# Patient Record
Sex: FEMALE | Race: WHITE | NOT HISPANIC OR LATINO | Employment: UNEMPLOYED
[De-identification: names, ages, dates, MRNs, and addresses within clinical notes are randomized per-mention and may not be internally consistent; named-entity substitution may affect disease eponyms.]

---

## 2018-02-21 ENCOUNTER — HOSPITAL (OUTPATIENT)
Dept: OTHER | Age: 58
End: 2018-02-21
Attending: EMERGENCY MEDICINE

## 2018-02-21 LAB
AMORPH SED URNS QL MICRO: ABNORMAL
ANALYZER ANC (IANC): ABNORMAL
ANION GAP SERPL CALC-SCNC: 13 MMOL/L (ref 10–20)
APPEARANCE UR: CLEAR
BACTERIA #/AREA URNS HPF: ABNORMAL /HPF
BASOPHILS # BLD: 0 THOUSAND/MCL (ref 0–0.3)
BASOPHILS NFR BLD: 0 %
BILIRUB UR QL: NEGATIVE
BUN SERPL-MCNC: 14 MG/DL (ref 6–20)
BUN/CREAT SERPL: 20 (ref 7–25)
CALCIUM SERPL-MCNC: 9.1 MG/DL (ref 8.4–10.2)
CAOX CRY URNS QL MICRO: ABNORMAL
CHLORIDE: 110 MMOL/L (ref 98–107)
CO2 SERPL-SCNC: 26 MMOL/L (ref 21–32)
COLOR UR: ABNORMAL
CREAT SERPL-MCNC: 0.7 MG/DL (ref 0.51–0.95)
DIFFERENTIAL METHOD BLD: ABNORMAL
EOSINOPHIL # BLD: 0.2 THOUSAND/MCL (ref 0.1–0.5)
EOSINOPHIL NFR BLD: 3 %
EPITH CASTS #/AREA URNS LPF: ABNORMAL /[LPF]
ERYTHROCYTE [DISTWIDTH] IN BLOOD: 14 % (ref 11–15)
FATTY CASTS #/AREA URNS LPF: ABNORMAL /[LPF]
GLUCOSE SERPL-MCNC: 98 MG/DL (ref 65–99)
GLUCOSE UR-MCNC: NEGATIVE MG/DL
GRAN CASTS #/AREA URNS LPF: ABNORMAL /[LPF]
HEMATOCRIT: 36.2 % (ref 36–46.5)
HGB BLD-MCNC: 11.7 GM/DL (ref 12–15.5)
HGB UR QL: ABNORMAL
HYALINE CASTS #/AREA URNS LPF: ABNORMAL /LPF (ref 0–5)
KETONES UR-MCNC: NEGATIVE MG/DL
LEUKOCYTE ESTERASE UR QL STRIP: NEGATIVE
LITHIUM SERPL-SCNC: 1.1 MMOL/L (ref 0.6–1.2)
LYMPHOCYTES # BLD: 1.5 THOUSAND/MCL (ref 1–4)
LYMPHOCYTES NFR BLD: 18 %
MCH RBC QN AUTO: 29 PG (ref 26–34)
MCHC RBC AUTO-ENTMCNC: 32.3 GM/DL (ref 32–36.5)
MCV RBC AUTO: 89.8 FL (ref 78–100)
MIXED CELL CASTS #/AREA URNS LPF: ABNORMAL /[LPF]
MONOCYTES # BLD: 0.4 THOUSAND/MCL (ref 0.3–0.9)
MONOCYTES NFR BLD: 5 %
MUCOUS THREADS URNS QL MICRO: PRESENT
NEUTROPHILS # BLD: 6.2 THOUSAND/MCL (ref 1.8–7.7)
NEUTROPHILS NFR BLD: 74 %
NEUTS SEG NFR BLD: ABNORMAL %
NITRITE UR QL: NEGATIVE
PERCENT NRBC: ABNORMAL
PH UR: 6 UNIT (ref 5–7)
PLATELET # BLD: 223 THOUSAND/MCL (ref 140–450)
POTASSIUM SERPL-SCNC: 3.7 MMOL/L (ref 3.4–5.1)
PROT UR QL: NEGATIVE MG/DL
RBC # BLD: 4.03 MILLION/MCL (ref 4–5.2)
RBC #/AREA URNS HPF: ABNORMAL /HPF (ref 0–3)
RBC CASTS #/AREA URNS LPF: ABNORMAL /[LPF]
RENAL EPI CELLS #/AREA URNS HPF: ABNORMAL /[HPF]
SODIUM SERPL-SCNC: 145 MMOL/L (ref 135–145)
SP GR UR: 1.01 (ref 1–1.03)
SPECIMEN SOURCE: ABNORMAL
SPERM URNS QL MICRO: ABNORMAL
SQUAMOUS #/AREA URNS HPF: ABNORMAL /HPF (ref 0–5)
T VAGINALIS URNS QL MICRO: ABNORMAL
TRI-PHOS CRY URNS QL MICRO: ABNORMAL
URATE CRY URNS QL MICRO: ABNORMAL
URINE REFLEX: ABNORMAL
URNS CMNT MICRO: ABNORMAL
UROBILINOGEN UR QL: 0.2 MG/DL (ref 0–1)
WAXY CASTS #/AREA URNS LPF: ABNORMAL /[LPF]
WBC # BLD: 8.4 THOUSAND/MCL (ref 4.2–11)
WBC #/AREA URNS HPF: ABNORMAL /HPF (ref 0–5)
WBC CASTS #/AREA URNS LPF: ABNORMAL /[LPF]
YEAST HYPHAE URNS QL MICRO: ABNORMAL
YEAST URNS QL MICRO: ABNORMAL

## 2018-02-27 ENCOUNTER — HOSPITAL (OUTPATIENT)
Dept: OTHER | Age: 58
End: 2018-02-27
Attending: HOSPITALIST

## 2018-02-27 LAB
2009 H1N1 SUBTYPE (RF1N1): NOT DETECTED
ADENOVIRUS (RADENO): NOT DETECTED
ANALYZER ANC (IANC): ABNORMAL
ANION GAP SERPL CALC-SCNC: 11 MMOL/L (ref 10–20)
BASOPHILS # BLD: 0 THOUSAND/MCL (ref 0–0.3)
BASOPHILS NFR BLD: 0 %
BOCAVIRUS (RBOCA): NOT DETECTED
BUN SERPL-MCNC: 16 MG/DL (ref 6–20)
BUN/CREAT SERPL: 23 (ref 7–25)
C. PNEUMONIAE (RCHLP): NOT DETECTED
CALCIUM SERPL-MCNC: 9.7 MG/DL (ref 8.4–10.2)
CHLORIDE: 106 MMOL/L (ref 98–107)
CO2 SERPL-SCNC: 26 MMOL/L (ref 21–32)
CORONAVIRUS 229E (RC229E): NOT DETECTED
CORONAVIRUS HKU1 (RCHKU1): NOT DETECTED
CORONAVIRUS NL63 (RCNL63): NOT DETECTED
CORONAVIRUS OC43 (RCO43): NOT DETECTED
CREAT SERPL-MCNC: 0.71 MG/DL (ref 0.51–0.95)
DIFFERENTIAL METHOD BLD: ABNORMAL
EOSINOPHIL # BLD: 0.2 THOUSAND/MCL (ref 0.1–0.5)
EOSINOPHIL NFR BLD: 4 %
ERYTHROCYTE [DISTWIDTH] IN BLOOD: 14.7 % (ref 11–15)
GLUCOSE SERPL-MCNC: 105 MG/DL (ref 65–99)
HEMATOCRIT: 38.6 % (ref 36–46.5)
HGB BLD-MCNC: 12.3 GM/DL (ref 12–15.5)
INFLUENZA A SUBTYPE H1 (RFLH1): NOT DETECTED
INFLUENZA A SUBTYPE H3 (RFLH3): NOT DETECTED
INFLUENZA A UNSUBTYPABLE (RIAU): NOT DETECTED
INFLUENZA B VIRUS (XFLUB): NOT DETECTED
LITHIUM SERPL-SCNC: 1.3 MMOL/L (ref 0.6–1.2)
LYMPHOCYTES # BLD: 0.7 THOUSAND/MCL (ref 1–4)
LYMPHOCYTES NFR BLD: 12 %
M. PNEUMONIAE (RMYPP): NOT DETECTED
MCH RBC QN AUTO: 29.1 PG (ref 26–34)
MCHC RBC AUTO-ENTMCNC: 31.9 GM/DL (ref 32–36.5)
MCV RBC AUTO: 91.3 FL (ref 78–100)
METAPNEUMOVIRUS (RMETA): POSITIVE
MONOCYTES # BLD: 0.7 THOUSAND/MCL (ref 0.3–0.9)
MONOCYTES NFR BLD: 12 %
NEUTROPHILS # BLD: 4.1 THOUSAND/MCL (ref 1.8–7.7)
NEUTROPHILS NFR BLD: 72 %
NEUTS SEG NFR BLD: ABNORMAL %
PARAINFLUENZA, TYPE 1 (RPAR1): NOT DETECTED
PARAINFLUENZA, TYPE 2 (RPAR2): NOT DETECTED
PARAINFLUENZA, TYPE 3 (RPAR3): NOT DETECTED
PARAINFLUENZA, TYPE 4 (RPAR4): NOT DETECTED
PERCENT NRBC: ABNORMAL
PLATELET # BLD: 211 THOUSAND/MCL (ref 140–450)
POTASSIUM SERPL-SCNC: 4.7 MMOL/L (ref 3.4–5.1)
POTASSIUM SERPL-SCNC: 5.3 MMOL/L (ref 3.4–5.1)
RBC # BLD: 4.23 MILLION/MCL (ref 4–5.2)
RHINOVIRUS/ENTEROVIRUS (RRHINO): NOT DETECTED
RSV, SUBTYPE A (RRSVA): NOT DETECTED
RSV, SUBTYPE B (RRSVB): NOT DETECTED
SODIUM SERPL-SCNC: 138 MMOL/L (ref 135–145)
SPECIMEN SOURCE: ABNORMAL
WBC # BLD: 5.6 THOUSAND/MCL (ref 4.2–11)

## 2018-02-28 LAB
ANALYZER ANC (IANC): ABNORMAL
ANION GAP SERPL CALC-SCNC: 12 MMOL/L (ref 10–20)
BASOPHILS # BLD: 0 THOUSAND/MCL (ref 0–0.3)
BASOPHILS NFR BLD: 0 %
BUN SERPL-MCNC: 15 MG/DL (ref 6–20)
BUN/CREAT SERPL: 21 (ref 7–25)
CALCIUM SERPL-MCNC: 9.1 MG/DL (ref 8.4–10.2)
CHLORIDE: 109 MMOL/L (ref 98–107)
CO2 SERPL-SCNC: 24 MMOL/L (ref 21–32)
CREAT SERPL-MCNC: 0.71 MG/DL (ref 0.51–0.95)
DIFFERENTIAL METHOD BLD: ABNORMAL
EOSINOPHIL # BLD: 0.1 THOUSAND/MCL (ref 0.1–0.5)
EOSINOPHIL NFR BLD: 2 %
ERYTHROCYTE [DISTWIDTH] IN BLOOD: 14.6 % (ref 11–15)
GLUCOSE SERPL-MCNC: 100 MG/DL (ref 65–99)
HEMATOCRIT: 34.3 % (ref 36–46.5)
HGB BLD-MCNC: 10.8 GM/DL (ref 12–15.5)
LYMPHOCYTES # BLD: 1.9 THOUSAND/MCL (ref 1–4)
LYMPHOCYTES NFR BLD: 32 %
MCH RBC QN AUTO: 28.9 PG (ref 26–34)
MCHC RBC AUTO-ENTMCNC: 31.5 GM/DL (ref 32–36.5)
MCV RBC AUTO: 91.7 FL (ref 78–100)
MONOCYTES # BLD: 0.8 THOUSAND/MCL (ref 0.3–0.9)
MONOCYTES NFR BLD: 13 %
NEUTROPHILS # BLD: 3.1 THOUSAND/MCL (ref 1.8–7.7)
NEUTROPHILS NFR BLD: 53 %
NEUTS SEG NFR BLD: ABNORMAL %
PERCENT NRBC: ABNORMAL
PLATELET # BLD: 202 THOUSAND/MCL (ref 140–450)
POTASSIUM SERPL-SCNC: 4 MMOL/L (ref 3.4–5.1)
RBC # BLD: 3.74 MILLION/MCL (ref 4–5.2)
SODIUM SERPL-SCNC: 141 MMOL/L (ref 135–145)
WBC # BLD: 5.9 THOUSAND/MCL (ref 4.2–11)

## 2018-03-18 ENCOUNTER — HOSPITAL ENCOUNTER (EMERGENCY)
Facility: HOSPITAL | Age: 58
Discharge: HOME | End: 2018-03-18
Attending: EMERGENCY MEDICINE
Payer: MEDICAID

## 2018-03-18 ENCOUNTER — APPOINTMENT (OUTPATIENT)
Dept: GENERAL RADIOLOGY | Facility: HOSPITAL | Age: 58
End: 2018-03-18
Attending: EMERGENCY MEDICINE
Payer: MEDICAID

## 2018-03-18 VITALS
WEIGHT: 150 LBS | DIASTOLIC BLOOD PRESSURE: 73 MMHG | SYSTOLIC BLOOD PRESSURE: 125 MMHG | HEIGHT: 68 IN | BODY MASS INDEX: 22.73 KG/M2 | OXYGEN SATURATION: 97 % | RESPIRATION RATE: 20 BRPM | HEART RATE: 71 BPM | TEMPERATURE: 98 F

## 2018-03-18 DIAGNOSIS — J18.9 COMMUNITY ACQUIRED PNEUMONIA OF RIGHT UPPER LOBE OF LUNG: Primary | ICD-10-CM

## 2018-03-18 PROCEDURE — 99283 EMERGENCY DEPT VISIT LOW MDM: CPT

## 2018-03-18 PROCEDURE — 71046 X-RAY EXAM CHEST 2 VIEWS: CPT | Performed by: EMERGENCY MEDICINE

## 2018-03-18 RX ORDER — DOXYCYCLINE HYCLATE 100 MG/1
100 CAPSULE ORAL 2 TIMES DAILY
Qty: 20 CAPSULE | Refills: 0 | Status: SHIPPED | OUTPATIENT
Start: 2018-03-18 | End: 2018-03-28

## 2018-03-18 RX ORDER — DOXYCYCLINE 100 MG/1
100 CAPSULE ORAL ONCE
Status: COMPLETED | OUTPATIENT
Start: 2018-03-18 | End: 2018-03-18

## 2018-03-18 NOTE — ED INITIAL ASSESSMENT (HPI)
Via medics from [employer].   Cough/congested x 4 days    Patient with pneumonia x 4 in the past few months

## 2018-03-18 NOTE — ED PROVIDER NOTES
Patient Seen in: [facility] AND [facility] Emergency Department    History   Patient presents with:  Cough/URI    Stated Complaint: congested    HPI    Patient presents to the emergency department with complaint of cough congestion for the past 4 days.   She tell 0638]  O2 Device: None (Room air) [03/18/18 0638]    Current:/73   Pulse 71   Temp 97.6 °F (36.4 °C) (Oral)   Resp 20   Ht 172.7 cm (5' 8\")   Wt 68 kg   LMP 01/14/2018   SpO2 97%   BMI 22.81 kg/m²         Physical Exam  Constitutional:  Alert, well address to make sure that she is going to get the medication and get to where she needs to go. They will see her for a second time.     ED Course   Labs Reviewed - No data to display     MDM     97% Normal  Pulse oximetry         Disposition and Plan     W

## 2018-03-18 NOTE — ED NOTES
Pt arrives to ed22 via EMS for c/o cough, congestion, body aches/chills for the past few days. Pt w/ hx of pneumonia and states her symptoms are similar but worse today. Pt aox4/4, full rom, speaking in complete sentences.

## 2018-03-18 NOTE — CM/SW NOTE
Asked to speak to Pt to assist her in finding follow up care. Pt is homeless and stays at [facility]. Pt gets a disability check every month and has a  through [employer]. Pt has [employer].  Pt states she has a PMD but hasn't seen him in 2

## 2018-03-26 ENCOUNTER — HOSPITAL (OUTPATIENT)
Dept: OTHER | Age: 58
End: 2018-03-26
Attending: EMERGENCY MEDICINE

## 2018-03-27 ENCOUNTER — HOSPITAL (OUTPATIENT)
Dept: OTHER | Age: 58
End: 2018-03-27
Attending: INTERNAL MEDICINE

## 2018-03-27 LAB
ALBUMIN SERPL-MCNC: 4.4 GM/DL (ref 3.6–5.1)
ALP SERPL-CCNC: 78 UNIT/L (ref 45–117)
ALT SERPL-CCNC: 21 UNIT/L
ANALYZER ANC (IANC): ABNORMAL
ANION GAP SERPL CALC-SCNC: 16 MMOL/L (ref 10–20)
AST SERPL-CCNC: 33 UNIT/L
BASOPHILS # BLD: 0 THOUSAND/MCL (ref 0–0.3)
BASOPHILS NFR BLD: 0 %
BILIRUB CONJ SERPL-MCNC: 0.1 MG/DL (ref 0–0.2)
BILIRUB SERPL-MCNC: 0.5 MG/DL (ref 0.2–1)
BUN SERPL-MCNC: 17 MG/DL (ref 6–20)
BUN/CREAT SERPL: 18 (ref 7–25)
CALCIUM SERPL-MCNC: 9.9 MG/DL (ref 8.4–10.2)
CHLORIDE: 101 MMOL/L (ref 98–107)
CO2 SERPL-SCNC: 23 MMOL/L (ref 21–32)
CREAT SERPL-MCNC: 0.92 MG/DL (ref 0.51–0.95)
DIFFERENTIAL METHOD BLD: ABNORMAL
EOSINOPHIL # BLD: 0.1 THOUSAND/MCL (ref 0.1–0.5)
EOSINOPHIL NFR BLD: 1 %
ERYTHROCYTE [DISTWIDTH] IN BLOOD: 14.1 % (ref 11–15)
ETHANOL SERPL-MCNC: NORMAL MG/DL
GLUCOSE SERPL-MCNC: 162 MG/DL (ref 65–99)
HEMATOCRIT: 36.7 % (ref 36–46.5)
HGB BLD-MCNC: 11.9 GM/DL (ref 12–15.5)
LITHIUM SERPL-SCNC: 2.3 MMOL/L (ref 0.6–1.2)
LYMPHOCYTES # BLD: 1.5 THOUSAND/MCL (ref 1–4)
LYMPHOCYTES NFR BLD: 14 %
MAGNESIUM SERPL-MCNC: 2 MG/DL (ref 1.7–2.4)
MCH RBC QN AUTO: 28.8 PG (ref 26–34)
MCHC RBC AUTO-ENTMCNC: 32.4 GM/DL (ref 32–36.5)
MCV RBC AUTO: 88.9 FL (ref 78–100)
MONOCYTES # BLD: 0.5 THOUSAND/MCL (ref 0.3–0.9)
MONOCYTES NFR BLD: 5 %
NEUTROPHILS # BLD: 8.2 THOUSAND/MCL (ref 1.8–7.7)
NEUTROPHILS NFR BLD: 80 %
NEUTS SEG NFR BLD: ABNORMAL %
PERCENT NRBC: ABNORMAL
PHOSPHATE SERPL-MCNC: 4.1 MG/DL (ref 2.4–4.7)
PLATELET # BLD: 260 THOUSAND/MCL (ref 140–450)
POTASSIUM SERPL-SCNC: 3.5 MMOL/L (ref 3.4–5.1)
PROT SERPL-MCNC: 8.1 GM/DL (ref 6.4–8.2)
RBC # BLD: 4.13 MILLION/MCL (ref 4–5.2)
SODIUM SERPL-SCNC: 136 MMOL/L (ref 135–145)
WBC # BLD: 10.3 THOUSAND/MCL (ref 4.2–11)

## 2018-03-28 ENCOUNTER — DIAGNOSTIC TRANS (OUTPATIENT)
Dept: OTHER | Age: 58
End: 2018-03-28

## 2018-03-28 LAB
ALBUMIN SERPL-MCNC: 3.4 GM/DL (ref 3.6–5.1)
ALBUMIN/GLOB SERPL: 1.1 {RATIO} (ref 1–2.4)
ALP SERPL-CCNC: 66 UNIT/L (ref 45–117)
ALT SERPL-CCNC: 15 UNIT/L
ANALYZER ANC (IANC): ABNORMAL
ANION GAP SERPL CALC-SCNC: 12 MMOL/L (ref 10–20)
AST SERPL-CCNC: 26 UNIT/L
BASOPHILS # BLD: 0 THOUSAND/MCL (ref 0–0.3)
BASOPHILS NFR BLD: 0 %
BILIRUB SERPL-MCNC: 0.4 MG/DL (ref 0.2–1)
BUN SERPL-MCNC: 13 MG/DL (ref 6–20)
BUN/CREAT SERPL: 13 (ref 7–25)
CALCIUM SERPL-MCNC: 9.4 MG/DL (ref 8.4–10.2)
CHLORIDE: 109 MMOL/L (ref 98–107)
CO2 SERPL-SCNC: 23 MMOL/L (ref 21–32)
CREAT SERPL-MCNC: 1.04 MG/DL (ref 0.51–0.95)
DIFFERENTIAL METHOD BLD: ABNORMAL
EOSINOPHIL # BLD: 0.2 THOUSAND/MCL (ref 0.1–0.5)
EOSINOPHIL NFR BLD: 2 %
ERYTHROCYTE [DISTWIDTH] IN BLOOD: 14.2 % (ref 11–15)
GLOBULIN SER-MCNC: 3.1 GM/DL (ref 2–4)
GLUCOSE BLDC GLUCOMTR-MCNC: 100 MG/DL (ref 65–99)
GLUCOSE SERPL-MCNC: 94 MG/DL (ref 65–99)
HEMATOCRIT: 33.5 % (ref 36–46.5)
HGB BLD-MCNC: 10.6 GM/DL (ref 12–15.5)
LITHIUM SERPL-SCNC: 1.4 MMOL/L (ref 0.6–1.2)
LYMPHOCYTES # BLD: 2.4 THOUSAND/MCL (ref 1–4)
LYMPHOCYTES NFR BLD: 24 %
MCH RBC QN AUTO: 28.4 PG (ref 26–34)
MCHC RBC AUTO-ENTMCNC: 31.6 GM/DL (ref 32–36.5)
MCV RBC AUTO: 89.8 FL (ref 78–100)
MONOCYTES # BLD: 0.8 THOUSAND/MCL (ref 0.3–0.9)
MONOCYTES NFR BLD: 8 %
NEUTROPHILS # BLD: 6.4 THOUSAND/MCL (ref 1.8–7.7)
NEUTROPHILS NFR BLD: 66 %
NEUTS SEG NFR BLD: ABNORMAL %
PERCENT NRBC: ABNORMAL
PLATELET # BLD: 212 THOUSAND/MCL (ref 140–450)
POTASSIUM SERPL-SCNC: 4.1 MMOL/L (ref 3.4–5.1)
PROT SERPL-MCNC: 6.5 GM/DL (ref 6.4–8.2)
RBC # BLD: 3.73 MILLION/MCL (ref 4–5.2)
SODIUM SERPL-SCNC: 140 MMOL/L (ref 135–145)
WBC # BLD: 9.8 THOUSAND/MCL (ref 4.2–11)

## 2018-06-23 ENCOUNTER — APPOINTMENT (OUTPATIENT)
Dept: GENERAL RADIOLOGY | Facility: HOSPITAL | Age: 58
End: 2018-06-23
Attending: EMERGENCY MEDICINE
Payer: MEDICAID

## 2018-06-23 ENCOUNTER — HOSPITAL ENCOUNTER (EMERGENCY)
Facility: HOSPITAL | Age: 58
Discharge: HOME | End: 2018-06-23
Attending: EMERGENCY MEDICINE
Payer: MEDICAID

## 2018-06-23 VITALS
TEMPERATURE: 98 F | HEIGHT: 68 IN | HEART RATE: 71 BPM | DIASTOLIC BLOOD PRESSURE: 65 MMHG | BODY MASS INDEX: 22.73 KG/M2 | SYSTOLIC BLOOD PRESSURE: 134 MMHG | OXYGEN SATURATION: 99 % | WEIGHT: 150 LBS | RESPIRATION RATE: 18 BRPM

## 2018-06-23 DIAGNOSIS — J32.9 CHRONIC SINUSITIS, UNSPECIFIED LOCATION: Primary | ICD-10-CM

## 2018-06-23 DIAGNOSIS — H66.93 BILATERAL OTITIS MEDIA, UNSPECIFIED OTITIS MEDIA TYPE: ICD-10-CM

## 2018-06-23 PROCEDURE — 99283 EMERGENCY DEPT VISIT LOW MDM: CPT

## 2018-06-23 PROCEDURE — 71046 X-RAY EXAM CHEST 2 VIEWS: CPT | Performed by: EMERGENCY MEDICINE

## 2018-06-23 RX ORDER — AMOXICILLIN AND CLAVULANATE POTASSIUM 875; 125 MG/1; MG/1
1 TABLET, FILM COATED ORAL 2 TIMES DAILY
Qty: 20 TABLET | Refills: 0 | Status: SHIPPED | OUTPATIENT
Start: 2018-06-23 | End: 2018-07-03

## 2018-06-23 RX ORDER — ALBUTEROL SULFATE 90 UG/1
2 AEROSOL, METERED RESPIRATORY (INHALATION) EVERY 4 HOURS PRN
Qty: 1 INHALER | Refills: 0 | Status: SHIPPED | OUTPATIENT
Start: 2018-06-23 | End: 2018-07-23

## 2018-06-23 NOTE — ED PROVIDER NOTES
Patient Seen in: [facility] Emergency Department    History   Patient presents with:  Cough/URI    Stated Complaint: congestion    HPI    The patient is a 44-year-old female with a history of multiple episodes of pneumonia in the past, presenting to she does complain of some mild tenderness along the bilateral cervical lymph nodes. Mucus membranes moist.  No stridor, trismus or drooling. Oropharynx is normal.  Uvula is midline. No tonsillar erythema, hypertrophy or exudates.   External ears are dru pneumothorax. BONES:  Normal for age.     =====  CONCLUSION:  No acute intrathoracic process identified. Patient has been observed and she has continued to be well-appearing. She is not hypoxic. Chest x-ray shows no pneumonia.   However I do believe

## 2018-06-23 NOTE — ED NOTES
Pt awake and alert,skin w/d,resps reg/unlabored. Pt appears comfortable. Pt aware awaiting MD dispo.

## 2018-07-14 ENCOUNTER — HOSPITAL ENCOUNTER (EMERGENCY)
Facility: HOSPITAL | Age: 58
Discharge: HOME | End: 2018-07-14
Attending: EMERGENCY MEDICINE
Payer: MEDICAID

## 2018-07-14 VITALS
DIASTOLIC BLOOD PRESSURE: 62 MMHG | HEIGHT: 68 IN | RESPIRATION RATE: 18 BRPM | WEIGHT: 150 LBS | HEART RATE: 62 BPM | BODY MASS INDEX: 22.73 KG/M2 | SYSTOLIC BLOOD PRESSURE: 102 MMHG | TEMPERATURE: 98 F | OXYGEN SATURATION: 98 %

## 2018-07-14 DIAGNOSIS — H65.191 ACUTE EFFUSION OF RIGHT EAR: Primary | ICD-10-CM

## 2018-07-14 PROCEDURE — 99283 EMERGENCY DEPT VISIT LOW MDM: CPT

## 2018-07-14 RX ORDER — PREDNISONE 20 MG/1
40 TABLET ORAL DAILY
Qty: 6 TABLET | Refills: 0 | Status: SHIPPED | OUTPATIENT
Start: 2018-07-14 | End: 2018-07-17

## 2018-07-14 RX ORDER — LORATADINE 10 MG/1
10 TABLET ORAL DAILY
Qty: 30 TABLET | Refills: 0 | Status: SHIPPED | OUTPATIENT
Start: 2018-07-14 | End: 2018-08-13

## 2018-07-14 NOTE — ED INITIAL ASSESSMENT (HPI)
Pt states she was seen here in emergency room 3 weeks ago for symptoms of  Her \"ears being plugged. \" Pt states she was given amoxicillin when she was seen here and finished the prescription without relief.

## 2018-07-14 NOTE — ED PROVIDER NOTES
Patient Seen in: [facility] Emergency Department    History   Patient presents with:  Ear Problem Pain (neurosensory)    Stated Complaint:     HPI    Here for feeling her right ear is blocked. It has been going on for 3 weeks.   Took amoxicillin did display    ED Course as of Jul 14 1540  ------------------------------------------------------------      MDM     Claritin, prednisone, sinus rinses, follow-up with PCP.           Disposition and Plan     Clinical Impression:  Acute effusion of right ear  (

## 2018-07-14 NOTE — ED NOTES
Discharge papers reviewed with pt, copy given. Pt given prescriptions for prednisone and loratadine. No questions at time of discharge.

## 2018-09-15 NOTE — ED PROVIDER NOTES
Patient Seen in: [facility] AND [facility] Emergency Department    History   Patient presents with:  Alcohol Problem    Stated Complaint: etoh    HPI    60-year-old female presents via EMS for alcohol intoxication.   Patient was found passed out in someone's lawn normal range of motion, full passive range of motion without pain and phonation normal. Neck supple. No tracheal tenderness, no spinous process tenderness and no muscular tenderness present. No neck rigidity. No erythema and normal range of motion present. Patient will be allowed to sober up in the ED or should family arrive can be discharged with them. She is endorsed to Dr. Ada Emerson. Imaging:   No results found.            Clinical impression as well as any lab results and radiology findings were discuss

## 2018-09-15 NOTE — ED INITIAL ASSESSMENT (HPI)
Pt found lying on ground outside a house which was not hers. Admits to etoh, slurred speech, cooperative at time of triage.

## 2018-09-15 NOTE — ED PROVIDER NOTES
Patient endorsed pending sobriety or sober ride. Reevaluated at 0300. Patient alert, oriented, clear speech stable gait at this time. Alert and oriented ×3. Clinically sober. No complaints, would like to go home.   Patient requested take a cab home, I f

## 2018-10-05 ENCOUNTER — APPOINTMENT (OUTPATIENT)
Dept: CT IMAGING | Facility: HOSPITAL | Age: 58
End: 2018-10-05
Attending: EMERGENCY MEDICINE
Payer: MEDICAID

## 2018-10-05 PROCEDURE — 70450 CT HEAD/BRAIN W/O DYE: CPT | Performed by: EMERGENCY MEDICINE

## 2018-10-05 NOTE — ED NOTES
Pt here, answering questions well but sleepy. Report given from Anatoliy. VIKI BURGOS does not want pt leaving. Security called. Pt clothes taken off, placed in gown prior to this RN arrival. Pt calm. Belongings searched.

## 2018-10-05 NOTE — ED NOTES
Nelly is reporting she is feeling anxious and requesting medications to help her relax. She denies any help from the PO ativan. Dr. Tali Jimenez will order IM meds.

## 2018-10-05 NOTE — ED INITIAL ASSESSMENT (HPI)
Pt sts she has been off of her diazepam because \"they took her off of it\" in the mean time she has been taking her lithium. Pt c/o of bilateral leg weakness, describes as \"pulled muscles\" pt called EMS to her home for assistance.   Denies wanting to hu

## 2018-10-05 NOTE — ED PROVIDER NOTES
Patient Seen in: [facility] AND [facility] Emergency Department     History   Patient presents with:  Lynn (psychiatric)    Stated Complaint:     HPI    63year old female with a history of bipolar joe presents to the ER complaining that the floor hit her in t without left periorbital erythema. Nose: Nose normal.   Mouth/Throat: Mucous membranes are normal. Mucous membranes are not pale and not cyanotic. Eyes: Conjunctivae and lids are normal. Right conjunctiva is not injected.  Left conjunctiva is not inject ---------                               -----------         ------                     CBC W/ DIFFERENTIAL[[id number]]          Abnormal            Final result                 Please view results for these tests on the individual orders.    UR disease. **Nausea/vomiting. ^*Warm or crowded place, prolonged orthostasis, fear/pain/other emotion.     EGSYS Interpretation:  -1    = or > 3: Cardiac syncope likely (95% sensitive), Mortality at 21–24 months= 17%  <3: Cardiac syncope less likely, Mortal emergency department population. [address] [place] [address] 2008 Aug;52(2):151-9. Epub 2008 Feb 20. PubMed PMID: [id number].     Limitations of history:   able to obtain history from patient  Factors limiting our ability to obtain a history: Gillian with tangential and dis further follow-up evaluation and treatment. Risk:  Patient is at High risk of significant complication or comorbidity.         Disposition and Plan     Clinical Impression:  Gillian ([facility] [address]  (primary encounter diagnosis)  Syncope and collapse    Disposition:

## 2018-10-05 NOTE — ED PROVIDER NOTES
Pt endorsed to me by Dr. Lluvia Pierce for continuation of care - CT without acute process, reevaluation without sinus tenderness. Labs unremarkable. PO ativan given for pt pacing in room and singing. Pt is medically cleared for psych transfer.  Pt to be tra

## 2018-10-05 NOTE — BH LEVEL OF CARE ASSESSMENT
Level of Care Assessment Note    General Questions  Why are you here?: \"Myself. My knees gave out and I fell on the floor. I needed help getting out of the shower and to the shower. \"  Pt reports this has happened to her once before when she was 47 year me out there. God\"  Is your experience of thoughts of dying by suicide: (EMIL)  Protective Factors: Pt reports that she is now on the correct medication. She also reports she lives with her mother.   Past Suicidal Ideation: Attempt  Describe: Pt reports s Paranoid  Describe Delusion: \"People\"  Pt did not specify  Depression Symptoms: Increased irritability  Depression Description: Pt would not specify symptoms. \"ARe you fucking kidding me? \"  Anxiety Symptoms: Generalized  Panic Attacks: None reported \"2 Twisted Tea every other day\"  How long with this pattern of use?: \"Since I was a kid\"  Last Use?: 5 days ago  Is your current use the most/worst it has ever been? : No    Illicit and Prescription Drug Use  Which if any illicit/prescription drugs hav Affect: Superficial  Stability of Affect: Labile  Attitude toward staff: Co-operative  Speech  Rate of Speech: Appropriate  Flow of Speech: Pressured  Intensity of Volume: Loud  Clarity: Slurred  Cognition  Concentration: Impaired  Memory: Recent memory in swearing. Pt denies SIB. Pt reports she thinks \"people\" are trying to harm her, but would not give details. Pt also reported she drinks 2 \"Twisted teas\" every other night but denies other drug use.   Due to pt's mood lability, lack of insight, disorg

## 2018-10-06 NOTE — ED NOTES
Report given to Yasir Mcgarry at [facility] reports he will re contact when a decision to accept this patient is made by the psychiatrist.

## 2018-10-06 NOTE — ED NOTES
Antonio Marlow from [facility] called back and reports Nelly will be accepted by Dr. Charlie Mitchell. Antonio Listen asks that we send pt no earlier than 2230 r/t high acuity on their unit. Nelly remains calm and resting on car.

## 2018-10-06 NOTE — ED NOTES
Received a call from a women who identified herself as the pt's daughter, but did not state her name. This person wanted to know where pt had been transferred to.  Writer explained HIPPA and medical information and was ultimately informed that I could not g

## 2018-11-09 NOTE — ED NOTES
No beds at [facility].  [facility]- no appropraite beds at this time (can call back later tonight if still looking). Called Patricia and faxed packet.

## 2018-11-09 NOTE — ED INITIAL ASSESSMENT (HPI)
Pt arrives via ems for ams. Per medics, pt was found walking down street with no clothes on. Pt was refsing to answer questions from PD and medics. Pt alert now.

## 2018-11-09 NOTE — ED PROVIDER NOTES
Patient Seen in: [facility] AND [facility] Emergency Department    History   Patient presents with:  Altered Mental Status (neurologic)    Stated Complaint: ams    HPI    Patient is a 67-year-old female found wandering naked in the snow.   Paramedics and police w and dry. No rash noted. Psychiatric: Her affect is labile. Her speech is tangential. She is agitated, aggressive and hyperactive. She expresses no suicidal plans and no homicidal plans. Nursing note and vitals reviewed.       I am leaving I just forgot

## 2018-11-09 NOTE — ED NOTES
No beds at [facility]. [facility] only goes up to age 54 for pts. [facility] does not have an appropriate bed at this time but may have discharges later if still looking. Called Renan He- left a message and faxed packet.

## 2018-11-09 NOTE — BH LEVEL OF CARE ASSESSMENT
Level of Care Assessment Note    General Questions  Why are you here?: \"They found me hanging myself by the rafter. The holy kid, the rafter kid. She can't stand anyone. She gets better and better. This way, that way, this way, that way.   Her fingerna Legal    Suicide Risk  Source of information for CSSR: Collateral  In what setting is the screener performed?: telephone  1. Have you wished you were dead or wished you could go to sleep and not wake up? (past 30 days): (EMIL- \"No, to fuck myself.   Every d History or Personal Lived Experience of Loss or Near Loss by Suicide: Denies    Danger to Others/Property  Have you harmed someone or had thoughts about wanting someone harmed or killed in the past 30 days?: No(\"Everybody in the fucking world is fucked up and not sleeping much)  Use of Sleep Aids: Unknown  Appetite Symptoms: Decreased  Unplanned Weight Loss: No(Unknown)  Unplanned Weight Gain: No  History of Eating Disorder: No  Active Eating Disorder: No School: No  Employment Status: Disabled  Job Issues: (N/A)  Concerns/Conflicts with Social Relationships: No  Decreased Functional Ability: Complete ADLs; Cook;Clean house(Per daughter)  Do you have any prior/current legal concerns?: None  History of Gang I Consciousness: Alert  Behavior  Exhibited behavior: Unable to participate    Assessment Summary  Assessment Summary: Pt is a 63 yr old female who was brought to ER after PD found her walking down the street naked in 31 degree weather.   Pt present as manic Disorder Manic: with Psychotic Features  Secondary Psychiatric Diagnoses  Substance Related and Addictive Disorders: Alcohol-Related Disorder - Alcohol use Disorder  Secondary Alcohol Use Disorder: Mild        Pertinent Non-psychiatric Diagnoses: None repo

## 2018-11-09 NOTE — ED NOTES
Pt unable to provide clean catch urine after 2 attempts d/t manic episode. Per MD, pt straight cathed for urine with assist by ED RN, ED tech and 2 security personnel. Pt cooperative for cath. 500ml clear yellow urine output.   Cath removed without incid

## 2018-11-10 NOTE — ED NOTES
Report given to [facility] Emergency Department. Abebe Karla will pass patients information to the psychiatrist and will await call back with decision on acceptance.

## 2021-01-19 NOTE — BH LEVEL OF CARE ASSESSMENT
Crisis Evaluation Assessment    Nelly Madrid YOB: 1960   Age 62year old MRN [id number]   Location [facility] Attending Karan Armas MD      Patient's legal sex: female  Patient identifies as: female  Marjan Rose denies current suicidal thoughts. She recently left a (mental health SNF), residential program ([facility]), where she had resided for close to a year.           Non-Suicidal Self-Injury:   denied        Access to Means:  Access to Means  Has access to means but stopped going, \"because it was the same stories over and over again. I was bored\"          Functional Achievement:   Left [facility] 12/28/21. Nelly reports she was supposed to be discharged in January.   However she elft to attend her daughter's wake/ uncomfortably full?: No  Do you worry that you have lost Control over how much you eat?: No  Have you recently lost more than One stone (14 lb) in a 3-month period?: No  Do you believe yourself to be Fat when others say you are too thin?: No  Would you say last one was . Nelly has been at [facility] for the past 12 months. She l;eft the end of December after her 25year old daughterdied. Nelly reports she was supposed to leave in January but, decide not to return after her daughter .   Nelly reports s

## 2021-01-19 NOTE — ED INITIAL ASSESSMENT (HPI)
Patient here c/o \"needing to calm down\" after verbal altercation with daughter at home. Currently denies SI/HI. Recent psych hospitalization. Denies a/v hallucinations. Patient pleasant and cooperative with staff.

## 2021-01-19 NOTE — ED PROVIDER NOTES
Patient Seen in: [facility] AND [facility] Emergency Department      History   Patient presents with:   Anxiety/Panic attack    Stated Complaint:     HPI/Subjective:   HPI    The patient is a 56-year-old female with a history of anxiety and bipolar disorder who p She is not ill-appearing. HENT:      Head: Normocephalic and atraumatic. Nose: Nose normal.      Mouth/Throat:      Mouth: Mucous membranes are moist.   Eyes:      Extraocular Movements: Extraocular movements intact.       Conjunctiva/sclera: Conjunc inpatient criteria for treatment. Outpatient referrals given and message will be sent to Dr. Savanah Dover for follow-up. I will refill 2 weeks of her medications.   Patient home in a cab         MDM      Pulse Ox: 99%, Normal, room air    Cardiac Monitor: Pu

## 2021-01-20 NOTE — CM/SW NOTE
[facility] called again for medicar after it was cancelled earlier because the pt stated it would take too long. She is back requesting another ride. [facility] called.  ETA: 60-90 minutes    PCS completed/

## 2021-01-23 ENCOUNTER — HOSPITAL ENCOUNTER (EMERGENCY)
Age: 61
Discharge: HOME | End: 2021-01-24
Attending: EMERGENCY MEDICINE

## 2021-01-23 DIAGNOSIS — F31.12 BIPOLAR AFFECTIVE DISORDER, CURRENTLY MANIC, MODERATE (CMD): Primary | ICD-10-CM

## 2021-01-23 LAB
AMPHETAMINES UR QL SCN>500 NG/ML: NEGATIVE
ANION GAP SERPL CALC-SCNC: 11 MMOL/L (ref 10–20)
BARBITURATES UR QL SCN>200 NG/ML: NEGATIVE
BASOPHILS # BLD: 0 K/MCL (ref 0–0.3)
BASOPHILS NFR BLD: 0 %
BENZODIAZ UR QL SCN>200 NG/ML: NEGATIVE
BUN SERPL-MCNC: 26 MG/DL (ref 6–20)
BUN/CREAT SERPL: 29 (ref 7–25)
BZE UR QL SCN>150 NG/ML: NEGATIVE
CALCIUM SERPL-MCNC: 9.5 MG/DL (ref 8.4–10.2)
CANNABINOIDS UR QL SCN>50 NG/ML: NEGATIVE
CHLORIDE SERPL-SCNC: 109 MMOL/L (ref 98–107)
CO2 SERPL-SCNC: 25 MMOL/L (ref 21–32)
CREAT SERPL-MCNC: 0.9 MG/DL (ref 0.51–0.95)
DEPRECATED RDW RBC: 42.3 FL (ref 39–50)
EOSINOPHIL # BLD: 0 K/MCL (ref 0–0.5)
EOSINOPHIL NFR BLD: 0 %
ERYTHROCYTE [DISTWIDTH] IN BLOOD: 12.5 % (ref 11–15)
ETHANOL SERPL-MCNC: NORMAL MG/DL
FASTING DURATION TIME PATIENT: ABNORMAL H
GFR SERPLBLD BASED ON 1.73 SQ M-ARVRAT: 70 ML/MIN/1.73M2
GLUCOSE SERPL-MCNC: 92 MG/DL (ref 65–99)
HCT VFR BLD CALC: 42.8 % (ref 36–46.5)
HGB BLD-MCNC: 13.9 G/DL (ref 12–15.5)
IMM GRANULOCYTES # BLD AUTO: 0 K/MCL (ref 0–0.2)
IMM GRANULOCYTES # BLD: 1 %
LYMPHOCYTES # BLD: 2.5 K/MCL (ref 1–4)
LYMPHOCYTES NFR BLD: 35 %
MCH RBC QN AUTO: 30.1 PG (ref 26–34)
MCHC RBC AUTO-ENTMCNC: 32.5 G/DL (ref 32–36.5)
MCV RBC AUTO: 92.6 FL (ref 78–100)
MONOCYTES # BLD: 0.9 K/MCL (ref 0.3–0.9)
MONOCYTES NFR BLD: 12 %
NEUTROPHILS # BLD: 3.7 K/MCL (ref 1.8–7.7)
NEUTROPHILS NFR BLD: 52 %
NRBC BLD MANUAL-RTO: 0 /100 WBC
OPIATES UR QL SCN>300 NG/ML: NEGATIVE
PCP UR QL SCN>25 NG/ML: NEGATIVE
PLATELET # BLD AUTO: 182 K/MCL (ref 140–450)
POTASSIUM SERPL-SCNC: 4 MMOL/L (ref 3.4–5.1)
RBC # BLD: 4.62 MIL/MCL (ref 4–5.2)
SARS-COV-2 RNA RESP QL NAA+PROBE: NOT DETECTED
SERVICE CMNT-IMP: NORMAL
SERVICE CMNT-IMP: NORMAL
SODIUM SERPL-SCNC: 141 MMOL/L (ref 135–145)
WBC # BLD: 7.1 K/MCL (ref 4.2–11)

## 2021-01-23 PROCEDURE — 99283 EMERGENCY DEPT VISIT LOW MDM: CPT

## 2021-01-23 PROCEDURE — 85025 COMPLETE CBC W/AUTO DIFF WBC: CPT | Performed by: INTERNAL MEDICINE

## 2021-01-23 PROCEDURE — U0003 INFECTIOUS AGENT DETECTION BY NUCLEIC ACID (DNA OR RNA); SEVERE ACUTE RESPIRATORY SYNDROME CORONAVIRUS 2 (SARS-COV-2) (CORONAVIRUS DISEASE [COVID-19]), AMPLIFIED PROBE TECHNIQUE, MAKING USE OF HIGH THROUGHPUT TECHNOLOGIES AS DESCRIBED BY CMS-2020-01-R: HCPCS | Performed by: EMERGENCY MEDICINE

## 2021-01-23 PROCEDURE — 80048 BASIC METABOLIC PNL TOTAL CA: CPT | Performed by: INTERNAL MEDICINE

## 2021-01-23 PROCEDURE — 36415 COLL VENOUS BLD VENIPUNCTURE: CPT

## 2021-01-23 PROCEDURE — 80307 DRUG TEST PRSMV CHEM ANLYZR: CPT | Performed by: INTERNAL MEDICINE

## 2021-01-23 PROCEDURE — 82077 ASSAY SPEC XCP UR&BREATH IA: CPT | Performed by: INTERNAL MEDICINE

## 2021-01-23 PROCEDURE — 10002803 HB RX 637: Performed by: INTERNAL MEDICINE

## 2021-01-23 PROCEDURE — C9803 HOPD COVID-19 SPEC COLLECT: HCPCS

## 2021-01-23 PROCEDURE — 10004651 HB RX, NO CHARGE ITEM

## 2021-01-23 PROCEDURE — 90839 PSYTX CRISIS INITIAL 60 MIN: CPT

## 2021-01-23 PROCEDURE — 87635 SARS-COV-2 COVID-19 AMP PRB: CPT | Performed by: EMERGENCY MEDICINE

## 2021-01-23 PROCEDURE — 99284 EMERGENCY DEPT VISIT MOD MDM: CPT | Performed by: INTERNAL MEDICINE

## 2021-01-23 RX ORDER — ACETAMINOPHEN 500 MG
TABLET ORAL
Status: COMPLETED
Start: 2021-01-23 | End: 2021-01-23

## 2021-01-23 RX ORDER — ACETAMINOPHEN 325 MG/1
975 TABLET ORAL ONCE
Status: COMPLETED | OUTPATIENT
Start: 2021-01-23 | End: 2021-01-23

## 2021-01-23 RX ORDER — RISPERIDONE 2 MG/1
2 TABLET ORAL ONCE
Status: COMPLETED | OUTPATIENT
Start: 2021-01-23 | End: 2021-01-23

## 2021-01-23 RX ADMIN — RISPERIDONE 2 MG: 2 TABLET ORAL at 18:58

## 2021-01-23 RX ADMIN — ACETAMINOPHEN 1000 MG: 325 TABLET ORAL at 20:46

## 2021-01-23 RX ADMIN — ACETAMINOPHEN 1000 MG: 500 TABLET ORAL at 20:46

## 2021-01-23 ASSESSMENT — ENCOUNTER SYMPTOMS
CHILLS: 0
FEVER: 0
VOMITING: 0
NERVOUS/ANXIOUS: 1
SHORTNESS OF BREATH: 0
FATIGUE: 0
COLOR CHANGE: 0
WOUND: 0
DIZZINESS: 0
DIAPHORESIS: 0
LIGHT-HEADEDNESS: 0
COUGH: 0
NAUSEA: 0
SORE THROAT: 0
EYE DISCHARGE: 0
ABDOMINAL PAIN: 0

## 2021-01-24 VITALS
WEIGHT: 188 LBS | HEIGHT: 68 IN | OXYGEN SATURATION: 95 % | TEMPERATURE: 96.6 F | HEART RATE: 74 BPM | DIASTOLIC BLOOD PRESSURE: 82 MMHG | BODY MASS INDEX: 28.49 KG/M2 | RESPIRATION RATE: 18 BRPM | SYSTOLIC BLOOD PRESSURE: 131 MMHG

## 2021-01-24 LAB
RAINBOW EXTRA TUBES HOLD SPECIMEN: NORMAL
RAINBOW EXTRA TUBES HOLD SPECIMEN: NORMAL

## 2021-06-18 ENCOUNTER — APPOINTMENT (OUTPATIENT)
Dept: GENERAL RADIOLOGY | Facility: HOSPITAL | Age: 61
End: 2021-06-18
Attending: EMERGENCY MEDICINE
Payer: MEDICAID

## 2021-06-18 ENCOUNTER — HOSPITAL ENCOUNTER (EMERGENCY)
Facility: HOSPITAL | Age: 61
Discharge: HOME | End: 2021-06-18
Attending: EMERGENCY MEDICINE
Payer: MEDICAID

## 2021-06-18 VITALS
HEIGHT: 68 IN | BODY MASS INDEX: 27.28 KG/M2 | HEART RATE: 82 BPM | SYSTOLIC BLOOD PRESSURE: 112 MMHG | OXYGEN SATURATION: 96 % | WEIGHT: 180 LBS | DIASTOLIC BLOOD PRESSURE: 70 MMHG | RESPIRATION RATE: 18 BRPM | TEMPERATURE: 98 F

## 2021-06-18 DIAGNOSIS — S20.229A CONTUSION OF BACK, UNSPECIFIED LATERALITY, INITIAL ENCOUNTER: Primary | ICD-10-CM

## 2021-06-18 PROCEDURE — 99284 EMERGENCY DEPT VISIT MOD MDM: CPT

## 2021-06-18 PROCEDURE — 72100 X-RAY EXAM L-S SPINE 2/3 VWS: CPT | Performed by: EMERGENCY MEDICINE

## 2021-06-18 PROCEDURE — 85025 COMPLETE CBC W/AUTO DIFF WBC: CPT | Performed by: EMERGENCY MEDICINE

## 2021-06-18 PROCEDURE — 93005 ELECTROCARDIOGRAM TRACING: CPT

## 2021-06-18 PROCEDURE — 93010 ELECTROCARDIOGRAM REPORT: CPT | Performed by: EMERGENCY MEDICINE

## 2021-06-18 PROCEDURE — 80048 BASIC METABOLIC PNL TOTAL CA: CPT | Performed by: EMERGENCY MEDICINE

## 2021-06-18 PROCEDURE — 36415 COLL VENOUS BLD VENIPUNCTURE: CPT

## 2021-06-18 RX ORDER — IBUPROFEN 600 MG/1
600 TABLET ORAL ONCE
Status: DISCONTINUED | OUTPATIENT
Start: 2021-06-18 | End: 2021-06-18

## 2021-06-18 NOTE — ED INITIAL ASSESSMENT (HPI)
Fell 1 week ago. States she has pain all over. Walked into fd to c/o weakness. Clear speech. A&o x4.

## 2021-06-19 NOTE — CM/SW NOTE
Spoke with patient in the ER waiting area regarding her request for transportation home.     [facility] offered to get patient a medicar home as her insurance is [employer], but patient refused to wait 90 mins - 2 hours and said she would arrange her own transporta

## 2021-06-19 NOTE — ED PROVIDER NOTES
Patient Seen in: [facility] AND [facility] Emergency Department      History   Patient presents with:  Fatigue    Stated Complaint: generalized weakness    HPI/Subjective:   HPI    18-year-old female presents for evaluation for lower back pain and some swelling Current:/54   Pulse 83   Temp 98.2 °F (36.8 °C) (Oral)   Resp 16   Ht 172.7 cm (5' 8\")   Wt 81.6 kg   SpO2 96%   BMI 27.37 kg/m²         Physical Exam  Vitals and nursing note reviewed.    Constitutional:       Appearance: She is well-developed Speech: Speech normal.         Behavior: Behavior normal.               ED Course     Labs Reviewed   BASIC METABOLIC PANEL (8) - Abnormal; Notable for the following components:       Result Value    Glucose 125 (*)     Chloride 114 (*)     Calcium, T acute fracture or acute malalignment VERTEBRAL BODIES:   Mild degenerative anterior spondylolisthesis L4 relative to L5. Marked disc space narrowing endplate discogenic changes vacuum disc effect L5-S1. Bilateral facet arthrosis L3-4, L4-5 and L5-S1.  [address]

## 2021-08-20 NOTE — ED INITIAL ASSESSMENT (HPI)
Per medics pt was wandering around outside, sitting in the grass laughing and talking to herself so a bystander called 911.

## 2021-08-20 NOTE — BH LEVEL OF CARE ASSESSMENT
Crisis Evaluation Assessment    [facility] YOB: 1960   Age 62year old MRN [id number]   Location [facility] Attending Karan Bridges MD      Patient's legal sex: female  Patient identifies as: female  Manuel Dixonco wandering into the road, Pee Sim had no safety concerns. Risk to Self or Others  Patient is unable to care for herself currently due to psychosis.             Suicide Risk Assessments:    Source of information for CSSR: Patient  In what setting is patterns and statements. Patient unable to answer basic questions. Patient oriented x2, currently thinking it is October. Unable to fully assess patient due to her inability to answer questions appropriately.             Substance Use:  \"I drink Vodka, Harmed by a Partner/Caregiver?: No  Health Concerns r/t Abuse: No    Mental Status Exam:   General Appearance  Characteristics: Disheveled  Eye Contact: Direct  Psychomotor Behavior  Gait/Movement: Brisk  Abnormal movements: None  Posture:  Other (comment); but unable to clarify quantity or frequency.             Risk/Protective Factors  Protective Factors: pt unable to report clearly    Level of Care Recommendations  Consulted with: Dr. Gissel Clement  Level of Care Recommendation: Inpatient Acute Care  Unit: [facility]

## 2021-08-20 NOTE — ED PROVIDER NOTES
Patient Seen in: [facility] AND [facility] Emergency Department      History   Patient presents with:  Eval-P    Stated Complaint: psych    HPI/Subjective:   HPI    Patient is a 72-year-old female who arrives by EMS for psychiatric assessment.   When asked why pa for the following components:       Result Value    BUN 30 (*)     BUN/CREA Ratio 33.0 (*)     Calculated Osmolality 302 (*)     All other components within normal limits   ETHYL ALCOHOL - Abnormal; Notable for the following components:    Ethyl Alcohol 3

## 2021-08-20 NOTE — ED NOTES
Attempted to reach daughter for collateral and find out if pt has a guardian or POA. No answer, left vague message requesting call back.

## 2021-08-21 NOTE — ED NOTES
New Certificate completed by Dr Jr Morales, (per request of Claire Ivory). Faxed Certificate to [facility] and spoke to Manas Oliver to set up transport. Coco Trinidad RN. New Certificate scanned into [facility].

## 2021-08-21 NOTE — ED QUICK NOTES
Received report from [facility]. Patient awake and alert, laying on bed. No distress. 1:1 observation continues.

## 2021-08-21 NOTE — ED NOTES
Writer met with patient to provide patient copies of patient rights and petition. Patient handed them back to writer and said \"I don't have rights. \" Patient agreed to have patient keep documents with chart for EMS.  Patient asked writer Brennen Shipley you get me a

## 2021-08-21 NOTE — ED NOTES
Per Tanya Montoya at [facility], patient has exhausted all resources and packet will not be considered in AM.

## 2021-08-21 NOTE — ED NOTES
Writer called [facility] and reached Mary who transferred writer to [facility] Rozina placed referral. Yasmin Douglas reviewing. [facility] deflected earlier in the shift after exhausting resources. Amanda will be able to receive referral after 14:00.

## 2021-08-21 NOTE — ED NOTES
Change of Status Form emailed to [facility] and scanned into [address]

## 2021-08-21 NOTE — ED QUICK NOTES
Patient daughter ask if the office would call her to let her know when the lab orders are put in for patient PSA and if there is anything else she needs to do prior to his appt. This ERT provided breakfast tray to pt. Tray currently at bedside.

## 2021-08-21 NOTE — ED NOTES
Francisca Grande with [facility] reported needing top line on a certificate signed. Fela Champion agreed to accept based on current paperwork so long as psychiatrist signs top line on second certificate.  Once they receive the second certificate, they will provide accepti

## 2021-08-21 NOTE — ED QUICK NOTES
RN report provided to Adelia at [address] [place] [address].   Vance Hamilton will call back if patient is accepted for transfer

## 2021-08-21 NOTE — ED NOTES
Sammy Franks to review packet tomorrow following discharges. Packet to be sent after COVID test result.

## 2021-08-21 NOTE — ED QUICK NOTES
Patient transferred from ER bed to an inpatient bed for comfort. Patient agreeable throughout the transfer.

## 2021-08-21 NOTE — ED PROVIDER NOTES
Patient endorsed to me by Dr. Jose Mercado for continuation of care. Patient is awaiting inpatient psychiatric transfer for schizophrenia and has been calm throughout my shift. Patient endorsed to Dr. Julissa Anthony for continuation of care.

## 2021-08-21 NOTE — ED QUICK NOTES
Patel Kc from [facility] called and patient is accepted at [facility] in [place]. Accepting MD is Dr. Magda Vega. CERTIFICATE NEEDS TO BE RENEWED BY PSYCHIATRIST AND FAXED BEFORE TRANSFER.         Room number is 466B for [facility]

## 2021-09-26 ENCOUNTER — HOSPITAL ENCOUNTER (EMERGENCY)
Facility: HOSPITAL | Age: 61
Discharge: HOME | End: 2021-09-26
Attending: EMERGENCY MEDICINE
Payer: MEDICAID

## 2021-09-26 VITALS
HEIGHT: 67 IN | BODY MASS INDEX: 27.47 KG/M2 | DIASTOLIC BLOOD PRESSURE: 49 MMHG | OXYGEN SATURATION: 95 % | SYSTOLIC BLOOD PRESSURE: 102 MMHG | TEMPERATURE: 98 F | HEART RATE: 59 BPM | RESPIRATION RATE: 18 BRPM | WEIGHT: 175 LBS

## 2021-09-26 DIAGNOSIS — N39.0 URINARY TRACT INFECTION WITHOUT HEMATURIA, SITE UNSPECIFIED: Primary | ICD-10-CM

## 2021-09-26 LAB
BILIRUB UR QL: NEGATIVE
COLOR UR: YELLOW
GLUCOSE UR-MCNC: NEGATIVE MG/DL
NITRITE UR QL STRIP.AUTO: NEGATIVE
PH UR: 6 [PH] (ref 5–8)
PROT UR-MCNC: 100 MG/DL
RBC #/AREA URNS AUTO: >10 /HPF
SP GR UR STRIP: 1.01 (ref 1–1.03)
UROBILINOGEN UR STRIP-ACNC: <2
WBC #/AREA URNS AUTO: >50 /HPF
WBC CLUMPS UR QL AUTO: PRESENT /HPF

## 2021-09-26 PROCEDURE — 99283 EMERGENCY DEPT VISIT LOW MDM: CPT

## 2021-09-26 PROCEDURE — 81001 URINALYSIS AUTO W/SCOPE: CPT | Performed by: EMERGENCY MEDICINE

## 2021-09-26 PROCEDURE — 87186 SC STD MICRODIL/AGAR DIL: CPT | Performed by: EMERGENCY MEDICINE

## 2021-09-26 PROCEDURE — 87086 URINE CULTURE/COLONY COUNT: CPT | Performed by: EMERGENCY MEDICINE

## 2021-09-26 PROCEDURE — 87077 CULTURE AEROBIC IDENTIFY: CPT | Performed by: EMERGENCY MEDICINE

## 2021-09-26 RX ORDER — CEFADROXIL 500 MG/1
500 CAPSULE ORAL 2 TIMES DAILY
Qty: 20 CAPSULE | Refills: 0 | Status: SHIPPED | OUTPATIENT
Start: 2021-09-26 | End: 2021-10-06

## 2021-09-26 NOTE — ED INITIAL ASSESSMENT (HPI)
Nelly arrives via [facility] from home for complaints of \"UTI symptoms\" Reports it hurts to urinates and states she was not prescribed those \"sulfa pill\" when she was discharged on 9/1.     Appears in no distress

## 2021-09-26 NOTE — ED PROVIDER NOTES
Patient Seen in: [facility] AND [facility] Emergency Department      History   Patient presents with:  Urinary Symptoms    Stated Complaint: uti symptoms    Subjective:   HPI    Patient is a 31-year-old female who arrives by EMS from home with 11 days of burning Urine Cloudy (*)     Ketones Urine Trace (*)     Blood Urine Large (*)     Protein Urine 100  (*)     Leukocyte Esterase Urine Large (*)     WBC Urine >50 (*)     RBC Urine >10 (*)     Bacteria Urine 1+ (*)     Squamous Epi.  Cells Few (*)     Transitional

## 2021-09-26 NOTE — ED QUICK NOTES
medicar arranged , eta 90 min  Lunch tray ordered  Apple juice provided  Discharge instructions reviewed.

## 2022-12-27 ENCOUNTER — HOSPITAL ENCOUNTER (EMERGENCY)
Facility: HOSPITAL | Age: 62
Discharge: HOME | End: 2022-12-27
Attending: EMERGENCY MEDICINE
Payer: MEDICAID

## 2022-12-27 VITALS
SYSTOLIC BLOOD PRESSURE: 144 MMHG | BODY MASS INDEX: 27.47 KG/M2 | HEIGHT: 67 IN | DIASTOLIC BLOOD PRESSURE: 72 MMHG | TEMPERATURE: 98 F | RESPIRATION RATE: 18 BRPM | WEIGHT: 175 LBS | OXYGEN SATURATION: 98 % | HEART RATE: 91 BPM

## 2022-12-27 DIAGNOSIS — R82.81 PYURIA: ICD-10-CM

## 2022-12-27 DIAGNOSIS — G47.00 INSOMNIA, UNSPECIFIED TYPE: Primary | ICD-10-CM

## 2022-12-27 PROBLEM — F31.63 BIPOLAR 1 DISORDER, MIXED, SEVERE (HCC): Status: ACTIVE | Noted: 2022-12-27

## 2022-12-27 PROBLEM — F31.89 OTHER BIPOLAR DISORDER (HCC): Status: ACTIVE | Noted: 2021-09-03

## 2022-12-27 PROBLEM — F20.9 SCHIZOPHRENIA (HCC): Status: ACTIVE | Noted: 2021-09-03

## 2022-12-27 LAB
AMPHET UR QL SCN: NEGATIVE
ANION GAP SERPL CALC-SCNC: 10 MMOL/L (ref 0–18)
BARBITURATES UR QL SCN: NEGATIVE
BASOPHILS # BLD AUTO: 0.03 X10(3) UL (ref 0–0.2)
BASOPHILS NFR BLD AUTO: 0.4 %
BENZODIAZ UR QL SCN: NEGATIVE
BILIRUB UR QL: NEGATIVE
BUN BLD-MCNC: 16 MG/DL (ref 7–18)
BUN/CREAT SERPL: 15.8 (ref 10–20)
CALCIUM BLD-MCNC: 10 MG/DL (ref 8.5–10.1)
CANNABINOIDS UR QL SCN: NEGATIVE
CHLORIDE SERPL-SCNC: 110 MMOL/L (ref 98–112)
CLARITY UR: CLEAR
CO2 SERPL-SCNC: 22 MMOL/L (ref 21–32)
COCAINE UR QL: NEGATIVE
COLOR UR: YELLOW
CREAT BLD-MCNC: 1.01 MG/DL
CREAT UR-SCNC: 51.2 MG/DL
DEPRECATED RDW RBC AUTO: 38.2 FL (ref 35.1–46.3)
EOSINOPHIL # BLD AUTO: 0.03 X10(3) UL (ref 0–0.7)
EOSINOPHIL NFR BLD AUTO: 0.4 %
ERYTHROCYTE [DISTWIDTH] IN BLOOD BY AUTOMATED COUNT: 12.7 % (ref 11–15)
ETHANOL SERPL-MCNC: <3 MG/DL (ref ?–3)
GFR SERPLBLD BASED ON 1.73 SQ M-ARVRAT: 63 ML/MIN/1.73M2 (ref 60–?)
GLUCOSE BLD-MCNC: 122 MG/DL (ref 70–99)
GLUCOSE UR-MCNC: NEGATIVE MG/DL
HCT VFR BLD AUTO: 41.3 %
HGB BLD-MCNC: 14.2 G/DL
HGB UR QL STRIP.AUTO: NEGATIVE
IMM GRANULOCYTES # BLD AUTO: 0.02 X10(3) UL (ref 0–1)
IMM GRANULOCYTES NFR BLD: 0.3 %
LYMPHOCYTES # BLD AUTO: 2.13 X10(3) UL (ref 1–4)
LYMPHOCYTES NFR BLD AUTO: 27.2 %
MCH RBC QN AUTO: 28.9 PG (ref 26–34)
MCHC RBC AUTO-ENTMCNC: 34.4 G/DL (ref 31–37)
MCV RBC AUTO: 83.9 FL
METHADONE UR QL SCN: NEGATIVE
MONOCYTES # BLD AUTO: 0.61 X10(3) UL (ref 0.1–1)
MONOCYTES NFR BLD AUTO: 7.8 %
NEUTROPHILS # BLD AUTO: 5.01 X10 (3) UL (ref 1.5–7.7)
NEUTROPHILS # BLD AUTO: 5.01 X10(3) UL (ref 1.5–7.7)
NEUTROPHILS NFR BLD AUTO: 63.9 %
NITRITE UR QL STRIP.AUTO: NEGATIVE
OPIATES UR QL SCN: NEGATIVE
OSMOLALITY SERPL CALC.SUM OF ELEC: 296 MOSM/KG (ref 275–295)
OXYCODONE UR QL SCN: NEGATIVE
PCP UR QL SCN: NEGATIVE
PH UR: 7 [PH] (ref 5–8)
PLATELET # BLD AUTO: 211 10(3)UL (ref 150–450)
POTASSIUM SERPL-SCNC: 3.7 MMOL/L (ref 3.5–5.1)
PROT UR-MCNC: NEGATIVE MG/DL
RBC # BLD AUTO: 4.92 X10(6)UL
SODIUM SERPL-SCNC: 142 MMOL/L (ref 136–145)
SP GR UR STRIP: 1.01 (ref 1–1.03)
TSI SER-ACNC: 0.71 MIU/ML (ref 0.36–3.74)
UROBILINOGEN UR STRIP-ACNC: <2
VIT C UR-MCNC: NEGATIVE MG/DL
WBC # BLD AUTO: 7.8 X10(3) UL (ref 4–11)

## 2022-12-27 PROCEDURE — 84443 ASSAY THYROID STIM HORMONE: CPT | Performed by: EMERGENCY MEDICINE

## 2022-12-27 PROCEDURE — 85025 COMPLETE CBC W/AUTO DIFF WBC: CPT | Performed by: EMERGENCY MEDICINE

## 2022-12-27 PROCEDURE — 99285 EMERGENCY DEPT VISIT HI MDM: CPT

## 2022-12-27 PROCEDURE — 81001 URINALYSIS AUTO W/SCOPE: CPT | Performed by: EMERGENCY MEDICINE

## 2022-12-27 PROCEDURE — 36415 COLL VENOUS BLD VENIPUNCTURE: CPT

## 2022-12-27 PROCEDURE — 80048 BASIC METABOLIC PNL TOTAL CA: CPT | Performed by: EMERGENCY MEDICINE

## 2022-12-27 PROCEDURE — 82077 ASSAY SPEC XCP UR&BREATH IA: CPT | Performed by: EMERGENCY MEDICINE

## 2022-12-27 PROCEDURE — 80307 DRUG TEST PRSMV CHEM ANLYZR: CPT | Performed by: EMERGENCY MEDICINE

## 2022-12-27 PROCEDURE — 87086 URINE CULTURE/COLONY COUNT: CPT | Performed by: EMERGENCY MEDICINE

## 2022-12-27 RX ORDER — RISPERIDONE 2 MG/1
2 TABLET ORAL 2 TIMES DAILY
COMMUNITY

## 2022-12-27 RX ORDER — NITROFURANTOIN 25; 75 MG/1; MG/1
100 CAPSULE ORAL 2 TIMES DAILY
Qty: 10 CAPSULE | Refills: 0 | Status: SHIPPED | OUTPATIENT
Start: 2022-12-27 | End: 2023-01-01

## 2022-12-27 RX ORDER — TRAZODONE HYDROCHLORIDE 150 MG/1
150 TABLET ORAL NIGHTLY
COMMUNITY

## 2022-12-27 RX ORDER — LORAZEPAM 1 MG/1
1 TABLET ORAL ONCE
Status: COMPLETED | OUTPATIENT
Start: 2022-12-27 | End: 2022-12-27

## 2022-12-27 RX ORDER — QUETIAPINE FUMARATE 25 MG/1
25 TABLET, FILM COATED ORAL ONCE
Status: COMPLETED | OUTPATIENT
Start: 2022-12-27 | End: 2022-12-27

## 2022-12-27 RX ORDER — QUETIAPINE FUMARATE 25 MG/1
25 TABLET, FILM COATED ORAL NIGHTLY
Qty: 30 TABLET | Refills: 0 | Status: SHIPPED | OUTPATIENT
Start: 2022-12-27 | End: 2023-01-26

## 2022-12-28 NOTE — PROGRESS NOTES
12/27/22 1952   COVID Exposure Risk Screening   Do you have any of the following new or worsening symptoms of COVID-19? None   Have you been diagnosed with COVID-19 within the past 10 days? No   Are you awaiting COVID-19 test results or do you have a COVID-19 test scheduled? No   In the past 10 days, have you been in contact with someone who was confirmed or suspected to have COVID-19?  No

## 2022-12-28 NOTE — ED QUICK NOTES
This RN spoke with daughter. Daughter stated she is unsure why patient stated suicidal thoughts. Daughter has no concerns for safety of patient at this time. Daughter stated her mother has just been having trouble sleeping.

## 2022-12-28 NOTE — DISCHARGE INSTRUCTIONS
Please call your psychiatrist Dr. Niki Ferguson to schedule an appointment to discuss medication management. If you have questions, please call the [facility] line at [phone]. If you have new or worsening symptoms including active suicidal or homicidal thoughts, please call 911 or have someone take you to your nearest emergency room.

## 2022-12-28 NOTE — PROGRESS NOTES
12/27/22 1959   Violence Aggression Assessment Checklist   Behaviors Exhibited By: Patient   Ova Samuel - Patient   History of Violence 1   Uncooperative 0   Verbal Abuse 0   Hostile/Attacking Objects 0   Threats 0   Assaultive/Combative 0   VAAC Risk Score 1   VAAC Level of Risk Moderate Risk

## 2022-12-28 NOTE — ED QUICK NOTES
Safety plan discussed with patient, belongings given back to patient. Patient states she will call cab for ride home.
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
## 2024-10-10 NOTE — LETTER
Date & Time: 10/1/2021, 9:00 AM  Patient: Veronica Tamayo    Dear Veronica Tamayo,    We are unable to reach you by phone and would like to follow up with you regarding test results from your Emergency Department visit.         Please contact the Emergency
[FreeTextEntry1] :  Ms. MUNOZ is a 78 year old female with a history of orthopedic injuries and decay (spine, shoulders), appendicitis, gall bladder disease, fibroid uterus, HTN, HLD, hypothyroid, IBS, arthritis, PMR, family history of lung cancer (mother), GERD who now comes in for a follow up pulmonary evaluation for SOB, chronic cough, chronic rhinitis, LPR/GERD, ?RAY- stable except arthritis     The patient's SOB is felt to be multifactorial: -poor mechanics of breathing   -poor balance -out of shape/overweight -Pulmonary   -?asthma -Cardiac (Emma)  The patient's chronic cough is felt to be multifactorial: -?cough-variant asthma -GERD -allergies -?bronchiectasis   Problem 1: ?Asthma (?cough-variant asthma) -complete methacholine challenge -Asthma is believed to be caused by inherited (genetic) and environmental factor, but its exact cause is unknown. asthma may be triggered by allergens, lung infections, or irritants in the air. Asthma triggers are different for each person    Problem 2: Chronic rhinitis/?allergies -add Olopatadine 0.6% 1 sniff BID  -s/p blood work to include: asthma panel, food IgE panel, IgE level, eosinophil level, vitamin D level (WNL) -Environmental measures for allergies were encouraged including mattress and pillow cover, air purifier, and environmental controls.    Problem 3: GERD (Aubrey Hager) -add Pepcid 40 mg QHS  -continue Asaphex qAM, pre-breakfast -f/u Aubrey Hager -Rule of 2s: avoid eating too much, eating too late, eating too spicy, eating two hours before bed. - Things to avoid including overeating, spicy foods, tight clothing, eating within two hours of bed, this list is not all inclusive. - For treatments of reflux, possible options discussed including diet control, H2 blockers, PPIs, as well as coating motility agents discussed as treatment options. Timing of meals and proximity of last meal to sleep were discussed. If symptoms persist, a formal gastrointestinal evaluation is needed.   Problem 4: Chronic Cough -as above -Any cough greater than eight weeks duration- differential diagnosis includes- asthma, upper airway cough syndrome, post nasal drip syndrome, gastroesophageal reflux, laryngopharyngeal reflux, cardiac disease (congestive heart failure, medicines, effects, etc.), medication effects (b-blockers, ace inhibitors, ARBs, glaucoma meds, etc.), smoking infectious, multifactorial, etc.    Problem 5: NO Bronchiectasis c/w scarring  -s/p HRCT of the chest -Seen on the CT of the chest or chest x-ray signifies damaged bronchial tubes focal or diffuse which can be sites of recurrent infections. These areas can be colonized by various organisms including bacteria (hemophilous influenza/Pseudomonas species etc.) as well as acid fast bacilli (myobacterial disease- inclusive of TB/AMADO etc.). Sputum either for bacteria culture/sensitivity or AFB culture and sensitivity will need to be sent if the patient has sputum- 3 specimens on consecutive days will need to be dropped at the laboratory- if the patient can produce sputum.   Problem 6: ?RAY (risk factors: snoring, poor sleep, elevated Mallampati class, fibromyalgia, LPR, nocturia) (NC) -complete home sleep study  -Sleep apnea is associated with adverse clinical consequences which can affect most organ systems. Cardiovascular disease risk includes arrhythmias, atrial fibrillation, hypertension, coronary artery disease, and stroke. Metabolic disorders include diabetes type 2, non-alcoholic fatty liver disease. Mood disorder especially depression; and cognitive decline especially in the elderly. Associations with chronic reflux/Barretts esophagus some but not all inclusive.  -Reasons include arousal consistent with hypopnea; respiratory events most prominent in REM sleep or supine position; therefore sleep staging and body position are important for accurate diagnosis and estimation of AHI.    Problem 7: Cardiac -Recommend cardiac follow up evaluation with cardiologist if needed   Problem 8: overweight/out of shape -recommended berberine OTC  -Recommended Ronnell Atkinson's 10-day detox diet and book. -Recommend "Muniq" OTC for weight loss, energy, and blood sugar - Weight loss, exercise and diet control were discussed and are highly encouraged. Treatment options were given such as aqua therapy, and contacting a nutritionist. Recommended to use the elliptical, stationary bike, less use of treadmill. Mindful eating was explained to the patient. Obesity is associated with worsening asthma, SOB, and potential for cardiac disease, diabetes, and other underlying medical conditions.   Problem 9: Poor mechanics of breathing/ Poor balance -recommended balance therapy -Recommended Domo Garcia and Jenniffer breathing techniques -Recommended [email or web address] and to YouTube "aerobic exercises for seniors" -Proper breathing techniques were reviewed with an emphasis on exhalation. Patient instructed to breath in for 1 second and out for four seconds. Patient was encouraged not to talk while walking.   Problem 10: Health Maintenance -s/p flu shot 2024 -recommended strep pneumonia vaccines: Prevnar-20 vaccine, follow by Pneumo vaccine 23 one year following -recommended early intervention for URIs -recommended regular osteoporosis evaluations -recommended early dermatological evaluations -recommended after the age of 50 to the age of 70, colonoscopy every 5 years   f/u in 6-8 weeks pt is encouraged to call or fax the office with any questions or concerns.

## (undated) NOTE — ED AVS SNAPSHOT
Jaylan Zelaya   MRN: [id number]    Department:  [facility] Emergency Department   Date of Visit:  6/23/2018           Disclosure     Insurance plans vary and the physician(s) referred by the ER may not be covered by your plan.  Please contact your tell this physician (or your personal doctor if your instructions are to return to your personal doctor) about any new or lasting problems. The primary care or specialist physician will see patients referred from the [facility] Emergency Department.  Nasreen Trujillo

## (undated) NOTE — ED AVS SNAPSHOT
Guerline Valencia   MRN: [id number]    Department:  [facility] Emergency Department   Date of Visit:  9/14/2018           Disclosure     Insurance plans vary and the physician(s) referred by the ER may not be covered by your plan.  Please contact y CARE PHYSICIAN AT ONCE OR RETURN IMMEDIATELY TO THE EMERGENCY DEPARTMENT. If you have been prescribed any medication(s), please fill your prescription right away and begin taking the medication(s) as directed.   If you believe that any of the medications

## (undated) NOTE — ED AVS SNAPSHOT
Marilou Dumont   MRN: [id number]    Department:  [facility] Emergency Department   Date of Visit:  3/18/2018           Disclosure     Insurance plans vary and the physician(s) referred by the ER may not be covered by your plan.  Please contact y CARE PHYSICIAN AT ONCE OR RETURN IMMEDIATELY TO THE EMERGENCY DEPARTMENT. If you have been prescribed any medication(s), please fill your prescription right away and begin taking the medication(s) as directed.   If you believe that any of the medications

## (undated) NOTE — ED AVS SNAPSHOT
Krystina Fields   MRN: [id number]    Department:  [facility] Emergency Department   Date of Visit:  7/14/2018           Disclosure     Insurance plans vary and the physician(s) referred by the ER may not be covered by your plan.  Please contact your tell this physician (or your personal doctor if your instructions are to return to your personal doctor) about any new or lasting problems. The primary care or specialist physician will see patients referred from the [facility] Emergency Department.  Leela Durán

## (undated) NOTE — LETTER
Date & Time: 8/27/2021, 10:53 AM  Patient: Marilou Dumont    Dear Marilou Dumont,    We are unable to reach you by phone and would like to follow up with you regarding test results from your Emergency Department visit.         Please contact the Emergency